# Patient Record
Sex: MALE
[De-identification: names, ages, dates, MRNs, and addresses within clinical notes are randomized per-mention and may not be internally consistent; named-entity substitution may affect disease eponyms.]

---

## 2024-07-26 PROBLEM — Z00.00 ENCOUNTER FOR PREVENTIVE HEALTH EXAMINATION: Status: ACTIVE | Noted: 2024-07-26

## 2024-07-29 ENCOUNTER — APPOINTMENT (OUTPATIENT)
Dept: ORTHOPEDIC SURGERY | Facility: CLINIC | Age: 44
End: 2024-07-29
Payer: COMMERCIAL

## 2024-07-29 VITALS
BODY MASS INDEX: 28.23 KG/M2 | OXYGEN SATURATION: 96 % | WEIGHT: 220 LBS | HEART RATE: 71 BPM | TEMPERATURE: 98.7 F | HEIGHT: 74 IN

## 2024-07-29 DIAGNOSIS — F17.200 NICOTINE DEPENDENCE, UNSPECIFIED, UNCOMPLICATED: ICD-10-CM

## 2024-07-29 DIAGNOSIS — M54.9 DORSALGIA, UNSPECIFIED: ICD-10-CM

## 2024-07-29 DIAGNOSIS — Z78.9 OTHER SPECIFIED HEALTH STATUS: ICD-10-CM

## 2024-07-29 PROCEDURE — 72070 X-RAY EXAM THORAC SPINE 2VWS: CPT

## 2024-07-29 PROCEDURE — 99205 OFFICE O/P NEW HI 60 MIN: CPT | Mod: 25

## 2024-07-30 NOTE — ASSESSMENT
[FreeTextEntry1] : 44-year-old male sustained mid back right-sided posterolateral flank contusion after fall from stairs approximately 1 month ago 0 = swallows foods/liquids without difficulty

## 2024-07-30 NOTE — PHYSICAL EXAM
[UE/LE] : Sensory: Intact in bilateral upper & lower extremities [Normal DTR Reflexes] : DTR reflexes normal [Normal Proprioception] : sensation intact for proprioception [Normal] : No costovertebral angle tenderness and no spinal tenderness [de-identified] : No focal tenderness in the right posterior lateral rib cage [de-identified] : [facility] lateral thoracic spine x-ray 7/29/2024 No evidence of fracture of the spine visualized ribs.  Alignment maintained

## 2024-07-30 NOTE — DISCUSSION/SUMMARY
[de-identified] : I reviewed the imaging as well as my impression of his symptoms with the patient.  I do feel that he sustained significant contusion to the area and may have some residual muscular restriction strain spasm that has resulted.  We discussed the potential to leave physical therapy for soft tissue mobilization and treatment.  Overall he feels that is relieved to know that a fracture is unlikely and feels very uncomfortable continuing to work and is confident this will progress to full healing.  I have spent greater than 60 minutes preparing to see the patient, collecting relevant history, performing a thorough history and physical examination, counseling the patient regarding my findings ordering the appropriate therapies and tests, communicating with other relevant healthcare professionals, documenting my encounter and coordinating care.

## 2024-07-30 NOTE — DISCUSSION/SUMMARY
Lesly Wyman(Attending) [de-identified] : I reviewed the imaging as well as my impression of his symptoms with the patient.  I do feel that he sustained significant contusion to the area and may have some residual muscular restriction strain spasm that has resulted.  We discussed the potential to leave physical therapy for soft tissue mobilization and treatment.  Overall he feels that is relieved to know that a fracture is unlikely and feels very uncomfortable continuing to work and is confident this will progress to full healing.  I have spent greater than 60 minutes preparing to see the patient, collecting relevant history, performing a thorough history and physical examination, counseling the patient regarding my findings ordering the appropriate therapies and tests, communicating with other relevant healthcare professionals, documenting my encounter and coordinating care.

## 2024-07-30 NOTE — HISTORY OF PRESENT ILLNESS
[de-identified] : 44-year-old male presents as new patient for evaluation of right-sided mid back pain.  On June 30, 2024 he slipped on a wet stair and fell onto his right mid back posterior lateral rib cage.  He has noticed some soreness and stiffness since that time that has been gradually improving however does persist.  He notices most when he picks up heavy things.  Denies any midline spinal pain

## 2024-07-30 NOTE — ASSESSMENT
[FreeTextEntry1] : 44-year-old male sustained mid back right-sided posterolateral flank contusion after fall from stairs approximately 1 month ago

## 2024-07-30 NOTE — HISTORY OF PRESENT ILLNESS
[de-identified] : 44-year-old male presents as new patient for evaluation of right-sided mid back pain.  On June 30, 2024 he slipped on a wet stair and fell onto his right mid back posterior lateral rib cage.  He has noticed some soreness and stiffness since that time that has been gradually improving however does persist.  He notices most when he picks up heavy things.  Denies any midline spinal pain

## 2024-07-30 NOTE — PHYSICAL EXAM
[UE/LE] : Sensory: Intact in bilateral upper & lower extremities [Normal DTR Reflexes] : DTR reflexes normal [Normal Proprioception] : sensation intact for proprioception [Normal] : No costovertebral angle tenderness and no spinal tenderness [de-identified] : No focal tenderness in the right posterior lateral rib cage [de-identified] : [facility] lateral thoracic spine x-ray 7/29/2024 No evidence of fracture of the spine visualized ribs.  Alignment maintained